# Patient Record
Sex: MALE | Race: WHITE | HISPANIC OR LATINO | Employment: FULL TIME | URBAN - METROPOLITAN AREA
[De-identification: names, ages, dates, MRNs, and addresses within clinical notes are randomized per-mention and may not be internally consistent; named-entity substitution may affect disease eponyms.]

---

## 2022-06-20 DIAGNOSIS — M25.512 BILATERAL SHOULDER PAIN, UNSPECIFIED CHRONICITY: Primary | ICD-10-CM

## 2022-06-20 DIAGNOSIS — M25.511 BILATERAL SHOULDER PAIN, UNSPECIFIED CHRONICITY: Primary | ICD-10-CM

## 2022-06-27 ENCOUNTER — HOSPITAL ENCOUNTER (OUTPATIENT)
Dept: RADIOLOGY | Facility: HOSPITAL | Age: 22
Discharge: HOME | End: 2022-06-27
Attending: ORTHOPAEDIC SURGERY
Payer: COMMERCIAL

## 2022-06-27 ENCOUNTER — OFFICE VISIT (OUTPATIENT)
Dept: SPORTS MEDICINE | Facility: CLINIC | Age: 22
End: 2022-06-27
Payer: COMMERCIAL

## 2022-06-27 VITALS
WEIGHT: 183.44 LBS | BODY MASS INDEX: 25.68 KG/M2 | HEART RATE: 74 BPM | DIASTOLIC BLOOD PRESSURE: 74 MMHG | HEIGHT: 71 IN | SYSTOLIC BLOOD PRESSURE: 135 MMHG

## 2022-06-27 DIAGNOSIS — M25.512 BILATERAL SHOULDER PAIN, UNSPECIFIED CHRONICITY: ICD-10-CM

## 2022-06-27 DIAGNOSIS — M25.511 CHRONIC RIGHT SHOULDER PAIN: Primary | ICD-10-CM

## 2022-06-27 DIAGNOSIS — G89.29 CHRONIC RIGHT SHOULDER PAIN: Primary | ICD-10-CM

## 2022-06-27 DIAGNOSIS — G25.89 SCAPULAR DYSKINESIS: ICD-10-CM

## 2022-06-27 DIAGNOSIS — M25.511 BILATERAL SHOULDER PAIN, UNSPECIFIED CHRONICITY: ICD-10-CM

## 2022-06-27 PROCEDURE — 73030 X-RAY EXAM OF SHOULDER: CPT | Mod: TC,50,PN

## 2022-06-27 PROCEDURE — 73030 XR SHOULDER COMPLETE 2 OR MORE VIEWS BILATERAL: ICD-10-PCS | Mod: 26,50,, | Performed by: RADIOLOGY

## 2022-06-27 PROCEDURE — 3078F DIAST BP <80 MM HG: CPT | Mod: CPTII,S$GLB,, | Performed by: ORTHOPAEDIC SURGERY

## 2022-06-27 PROCEDURE — 73030 X-RAY EXAM OF SHOULDER: CPT | Mod: 26,50,, | Performed by: RADIOLOGY

## 2022-06-27 PROCEDURE — 3075F SYST BP GE 130 - 139MM HG: CPT | Mod: CPTII,S$GLB,, | Performed by: ORTHOPAEDIC SURGERY

## 2022-06-27 PROCEDURE — 1159F MED LIST DOCD IN RCRD: CPT | Mod: CPTII,S$GLB,, | Performed by: ORTHOPAEDIC SURGERY

## 2022-06-27 PROCEDURE — 3008F BODY MASS INDEX DOCD: CPT | Mod: CPTII,S$GLB,, | Performed by: ORTHOPAEDIC SURGERY

## 2022-06-27 PROCEDURE — 3078F PR MOST RECENT DIASTOLIC BLOOD PRESSURE < 80 MM HG: ICD-10-PCS | Mod: CPTII,S$GLB,, | Performed by: ORTHOPAEDIC SURGERY

## 2022-06-27 PROCEDURE — 99999 PR PBB SHADOW E&M-EST. PATIENT-LVL III: ICD-10-PCS | Mod: PBBFAC,,, | Performed by: ORTHOPAEDIC SURGERY

## 2022-06-27 PROCEDURE — 3008F PR BODY MASS INDEX (BMI) DOCUMENTED: ICD-10-PCS | Mod: CPTII,S$GLB,, | Performed by: ORTHOPAEDIC SURGERY

## 2022-06-27 PROCEDURE — 3075F PR MOST RECENT SYSTOLIC BLOOD PRESS GE 130-139MM HG: ICD-10-PCS | Mod: CPTII,S$GLB,, | Performed by: ORTHOPAEDIC SURGERY

## 2022-06-27 PROCEDURE — 1159F PR MEDICATION LIST DOCUMENTED IN MEDICAL RECORD: ICD-10-PCS | Mod: CPTII,S$GLB,, | Performed by: ORTHOPAEDIC SURGERY

## 2022-06-27 PROCEDURE — 99999 PR PBB SHADOW E&M-EST. PATIENT-LVL III: CPT | Mod: PBBFAC,,, | Performed by: ORTHOPAEDIC SURGERY

## 2022-06-27 PROCEDURE — 99204 OFFICE O/P NEW MOD 45 MIN: CPT | Mod: S$GLB,,, | Performed by: ORTHOPAEDIC SURGERY

## 2022-06-27 PROCEDURE — 99204 PR OFFICE/OUTPT VISIT, NEW, LEVL IV, 45-59 MIN: ICD-10-PCS | Mod: S$GLB,,, | Performed by: ORTHOPAEDIC SURGERY

## 2022-06-27 NOTE — PROGRESS NOTES
CC: Bilateral shoulder pain     22 y.o. Male presents as a new patient to me.  He comes in with a stated complaint of bilateral shoulder pain.  Upon further discussion it sounds like his right shoulder is his primary issue.  He has a history of prior competitive swimming growing up in [place].  This was between 2006 and 2013. During that time he experienced some periscapular pain and dyskinesis which required some physical therapy and treatment.  This was beneficial.  He stopped swimming and began some recreational sports thereafter.  In 2014 he experienced a discrete injury to his right shoulder where his arm was forcefully pulled behind him and he felt acute onset pain with possibly an anterior instability event and shifting of the shoulder.  Since then, he has had chronic ongoing pain in the shoulder with mild subjective instability symptoms.  No prior discrete dislocation event.  Treatment has included physical therapy, most recent formally in [facility] with a private practice therapist.  This was for 6-8 weeks.  Pain is localized deep in the joint often posterior.  Occasional mechanical symptoms.  No neck or radicular symptoms.  Pain is present to some extent on a daily basis and is activity limiting.  He would like to be more active.  He still has some occasional subjective weakness and intermittent soreness in the left shoulder over the periscapular region but nothing too significant.      PAST MEDICAL HISTORY:   History reviewed. No pertinent past medical history.    PAST SURGICAL HISTORY:  History reviewed. No pertinent surgical history.    FAMILY HISTORY:  Family History   Problem Relation Age of Onset    Hypertension Father        MEDICATIONS:  No current outpatient medications on file.    ALLERGIES:  Review of patient's allergies indicates:  No Known Allergies     REVIEW OF SYSTEMS:  Constitution: Negative. Negative for chills, fever and night sweats.    Hematologic/Lymphatic: Negative for bleeding  "problem. Does not bruise/bleed easily.   Skin: Negative for dry skin, itching and rash.   Musculoskeletal: Negative for falls. Positive for right shoulder pain and muscle weakness.     All other review of symptoms were reviewed and found to be noncontributory.     PHYSICAL EXAMINATION:  Vitals:  /74   Pulse 74   Ht 5' 11" (1.803 m)   Wt 83.2 kg (183 lb 6.8 oz)   BMI 25.58 kg/m²    General: Well-developed well-nourished 22 y.o. malein no acute distress   Cardiovascular: Regular rhythm by palpation of distal pulse, normal color and temperature, no concerning varicosities on symptomatic side   Lungs: No labored breathing or wheezing appreciated   Neuro: Alert and oriented ×3   Psychiatric: well oriented to person, place and time, demonstrates normal mood and affect   Skin: No rashes, lesions or ulcers, normal temperature, turgor, and texture on uninvolved extremity    Ortho/SPM Exam  Examination of the right shoulder demonstrates intact overhead range of motion.  External rotation in the abducted position to 100°, 120° on the contralateral side.  He does have some pain in the abducted, externally rotated position.  Questionable anterior apprehension in terminal external rotation.  Some pain on anterior load and shift testing.  Negative posterior push-pull maneuver.  Increased total arc of motion on the contralateral side, total of 190°.  The patient does have some generalized hyperlaxity. Beighton's 4/9.  Negative sulcus sign bilaterally for instability or pain symptoms.  Mild bilateral scapular dyskinesis.  Intact rotator cuff strength.    IMAGING:  Xrays including AP, Outlet and Axillary Lateral of BILATERAL shoulder are ordered / images reviewed by me:   Negative    ASSESSMENT:      ICD-10-CM ICD-9-CM   1. Chronic right shoulder pain  M25.511 719.41    G89.29 338.29   2. Scapular dyskinesis  G25.89 781.3     PLAN:     Complaint of predominant right shoulder pain localized deep in the joint.  History of " prior discrete injury in 2014. Possible subtle symptomatic anterior instability with underlying labral tear.  He does have some baseline generalized hyperlaxity in both shoulders and history of prior scapular dyskinesis related symptoms.  I detect no multidirectional instability on exam today.    The right shoulder has been an ongoing issue now since 2014 and he recently has had a full course of formal physical therapy without much relief.  At this time would like to get a 3T MRI (contrast not currently available) of the shoulder for further assessment.  Return to clinic after study to discuss results treatment options.    Procedures

## 2022-07-13 ENCOUNTER — PATIENT MESSAGE (OUTPATIENT)
Dept: SPORTS MEDICINE | Facility: CLINIC | Age: 22
End: 2022-07-13
Payer: COMMERCIAL

## 2022-07-20 ENCOUNTER — HOSPITAL ENCOUNTER (OUTPATIENT)
Dept: RADIOLOGY | Facility: HOSPITAL | Age: 22
Discharge: HOME | End: 2022-07-20
Attending: ORTHOPAEDIC SURGERY
Payer: COMMERCIAL

## 2022-07-20 DIAGNOSIS — M25.511 CHRONIC RIGHT SHOULDER PAIN: ICD-10-CM

## 2022-07-20 DIAGNOSIS — G89.29 CHRONIC RIGHT SHOULDER PAIN: ICD-10-CM

## 2022-07-20 PROCEDURE — 73221 MRI JOINT UPR EXTREM W/O DYE: CPT | Mod: 26,RT,, | Performed by: RADIOLOGY

## 2022-07-20 PROCEDURE — 73221 MRI SHOULDER WITHOUT CONTRAST RIGHT: ICD-10-PCS | Mod: 26,RT,, | Performed by: RADIOLOGY

## 2022-07-20 PROCEDURE — 73221 MRI JOINT UPR EXTREM W/O DYE: CPT | Mod: TC,RT

## 2022-08-01 ENCOUNTER — OFFICE VISIT (OUTPATIENT)
Dept: SPORTS MEDICINE | Facility: CLINIC | Age: 22
End: 2022-08-01
Payer: COMMERCIAL

## 2022-08-01 VITALS
BODY MASS INDEX: 25.9 KG/M2 | DIASTOLIC BLOOD PRESSURE: 79 MMHG | HEIGHT: 71 IN | SYSTOLIC BLOOD PRESSURE: 131 MMHG | HEART RATE: 61 BPM | WEIGHT: 185 LBS

## 2022-08-01 DIAGNOSIS — M25.511 CHRONIC RIGHT SHOULDER PAIN: ICD-10-CM

## 2022-08-01 DIAGNOSIS — G25.89 SCAPULAR DYSKINESIS: ICD-10-CM

## 2022-08-01 DIAGNOSIS — G89.29 CHRONIC RIGHT SHOULDER PAIN: ICD-10-CM

## 2022-08-01 DIAGNOSIS — S43.431A SUPERIOR GLENOID LABRUM LESION OF RIGHT SHOULDER, INITIAL ENCOUNTER: Primary | ICD-10-CM

## 2022-08-01 PROCEDURE — 3075F PR MOST RECENT SYSTOLIC BLOOD PRESS GE 130-139MM HG: ICD-10-PCS | Mod: CPTII,S$GLB,, | Performed by: ORTHOPAEDIC SURGERY

## 2022-08-01 PROCEDURE — 99999 PR PBB SHADOW E&M-EST. PATIENT-LVL III: ICD-10-PCS | Mod: PBBFAC,,, | Performed by: ORTHOPAEDIC SURGERY

## 2022-08-01 PROCEDURE — 99214 PR OFFICE/OUTPT VISIT, EST, LEVL IV, 30-39 MIN: ICD-10-PCS | Mod: S$GLB,,, | Performed by: ORTHOPAEDIC SURGERY

## 2022-08-01 PROCEDURE — 3008F BODY MASS INDEX DOCD: CPT | Mod: CPTII,S$GLB,, | Performed by: ORTHOPAEDIC SURGERY

## 2022-08-01 PROCEDURE — 3008F PR BODY MASS INDEX (BMI) DOCUMENTED: ICD-10-PCS | Mod: CPTII,S$GLB,, | Performed by: ORTHOPAEDIC SURGERY

## 2022-08-01 PROCEDURE — 99999 PR PBB SHADOW E&M-EST. PATIENT-LVL III: CPT | Mod: PBBFAC,,, | Performed by: ORTHOPAEDIC SURGERY

## 2022-08-01 PROCEDURE — 3075F SYST BP GE 130 - 139MM HG: CPT | Mod: CPTII,S$GLB,, | Performed by: ORTHOPAEDIC SURGERY

## 2022-08-01 PROCEDURE — 3078F PR MOST RECENT DIASTOLIC BLOOD PRESSURE < 80 MM HG: ICD-10-PCS | Mod: CPTII,S$GLB,, | Performed by: ORTHOPAEDIC SURGERY

## 2022-08-01 PROCEDURE — 99214 OFFICE O/P EST MOD 30 MIN: CPT | Mod: S$GLB,,, | Performed by: ORTHOPAEDIC SURGERY

## 2022-08-01 PROCEDURE — 3078F DIAST BP <80 MM HG: CPT | Mod: CPTII,S$GLB,, | Performed by: ORTHOPAEDIC SURGERY

## 2022-08-01 PROCEDURE — 1159F MED LIST DOCD IN RCRD: CPT | Mod: CPTII,S$GLB,, | Performed by: ORTHOPAEDIC SURGERY

## 2022-08-01 PROCEDURE — 1159F PR MEDICATION LIST DOCUMENTED IN MEDICAL RECORD: ICD-10-PCS | Mod: CPTII,S$GLB,, | Performed by: ORTHOPAEDIC SURGERY

## 2022-08-02 ENCOUNTER — TELEPHONE (OUTPATIENT)
Dept: SPORTS MEDICINE | Facility: CLINIC | Age: 22
End: 2022-08-02
Payer: COMMERCIAL

## 2022-08-02 DIAGNOSIS — M25.511 CHRONIC RIGHT SHOULDER PAIN: Primary | ICD-10-CM

## 2022-08-02 DIAGNOSIS — G89.29 CHRONIC RIGHT SHOULDER PAIN: Primary | ICD-10-CM

## 2022-08-03 NOTE — PROGRESS NOTES
CC: Bilateral shoulder pain, R>L    Joo returns for follow-up of his right shoulder.  Recent MRI obtained.  He complains of pain deep in the shoulder with intermittent mechanical symptoms along with periscapular discomfort and symptoms associated with his longstanding dyskinesis.  He has had prior conservative treatment extensively to include multiple rounds of formal PT.    Prior History:   22 y.o. Male comes in with a stated complaint of bilateral shoulder pain.  Upon further discussion it sounds like his right shoulder is his primary issue.  He has a history of prior competitive swimming growing up in [place].  This was between 2006 and 2013. During that time he experienced some periscapular pain and dyskinesis which required some physical therapy and treatment.  This was beneficial.  He stopped swimming and began some recreational sports thereafter.  In 2014 he experienced a discrete injury to his right shoulder where his arm was forcefully pulled behind him and he felt acute onset pain with possibly an anterior instability event and shifting of the shoulder.  Since then, he has had chronic ongoing pain in the shoulder with mild subjective instability symptoms.  No prior discrete dislocation event.  Treatment has included physical therapy, most recent formally in [facility] with a private practice therapist.  This was for 6-8 weeks.  Pain is localized deep in the joint often posterior.  Occasional mechanical symptoms.  No neck or radicular symptoms.  Pain is present to some extent on a daily basis and is activity limiting.  He would like to be more active.  He still has some occasional subjective weakness and intermittent soreness in the left shoulder over the periscapular region but nothing too significant.      PAST MEDICAL HISTORY:   No past medical history on file.    PAST SURGICAL HISTORY:  No past surgical history on file.    FAMILY HISTORY:  Family History   Problem Relation Age of Onset    Hypertension  "Father      MEDICATIONS:  No current outpatient medications on file.    ALLERGIES:  Review of patient's allergies indicates:  No Known Allergies     REVIEW OF SYSTEMS:  Constitution: Negative. Negative for chills, fever and night sweats.    Hematologic/Lymphatic: Negative for bleeding problem. Does not bruise/bleed easily.   Skin: Negative for dry skin, itching and rash.   Musculoskeletal: Negative for falls. Positive for right shoulder pain and muscle weakness.     All other review of symptoms were reviewed and found to be noncontributory.     PHYSICAL EXAMINATION:  Vitals:  /79   Pulse 61   Ht 5' 11" (1.803 m)   Wt 83.9 kg (185 lb)   BMI 25.80 kg/m²    General: Well-developed well-nourished 22 y.o. malein no acute distress   Cardiovascular: Regular rhythm by palpation of distal pulse, normal color and temperature, no concerning varicosities on symptomatic side   Lungs: No labored breathing or wheezing appreciated   Neuro: Alert and oriented ×3   Psychiatric: well oriented to person, place and time, demonstrates normal mood and affect   Skin: No rashes, lesions or ulcers, normal temperature, turgor, and texture on uninvolved extremity    Ortho/SPM Exam  Examination of the right shoulder again demonstrates intact overhead range of motion.  Pain to palpation over the posterior glenohumeral joint line.  Negative AC joint.  Mild pain to palpation over the proximal biceps groove.  He does have positive biceps tension signs.  External rotation in the abducted position to 100°, 120° on the contralateral side.  He does have some pain in the abducted, externally rotated position.  Questionable anterior apprehension in terminal external rotation.  Some pain on anterior load and shift testing.  Positive compression rotation test.  Positive [place]'s test.  Negative posterior push-pull maneuver.  Increased total arc of motion on the contralateral side, total of 190°.  The patient does have some generalized " hyperlaxity. Beighton's 4/9.  Negative sulcus sign bilaterally for instability or pain symptoms.  Mild bilateral scapular dyskinesis.  Intact rotator cuff strength.    IMAGING:  Xrays including AP, Outlet and Axillary Lateral of BILATERAL shoulder are ordered / images reviewed by me:   Negative    MRI right shoulder (3T, non contrast):  Tear of superior labrum extending from anterior to posterior.    ASSESSMENT:      ICD-10-CM ICD-9-CM   1. Superior glenoid labrum lesion of right shoulder, initial encounter  S43.431A 840.7   2. Scapular dyskinesis  G25.89 781.3   3. Chronic right shoulder pain  M25.511 719.41    G89.29 338.29     PLAN:     Imaging reviewed with the patient.  He does have a fairly discrete SLAP tear on MRI.  Findings on exam correlate.  He also has longstanding symptoms related to his underlying bilateral scapular dyskinesis.  Treatment options for the shoulder discussed at this time.  He has had extensive prior conservative treatment.  We discussed arthroscopy for SLAP repair versus biceps tenodesis.  Details of surgery were reviewed to include the expected postop rehab and recovery course.  The patient would like to avoid surgery if possible and would like to continue with physical therapy.  He does not think his prior therapy was as good as it could have been.  New therapy referral placed.  Focus on periscapular stabilization and strengthening.  Return to clinic in 2 months.    Procedures

## 2022-08-19 ENCOUNTER — CLINICAL SUPPORT (OUTPATIENT)
Dept: REHABILITATION | Facility: HOSPITAL | Age: 22
End: 2022-08-19
Attending: ORTHOPAEDIC SURGERY
Payer: COMMERCIAL

## 2022-08-19 DIAGNOSIS — G89.29 CHRONIC RIGHT SHOULDER PAIN: ICD-10-CM

## 2022-08-19 DIAGNOSIS — M25.511 ACUTE PAIN OF RIGHT SHOULDER: ICD-10-CM

## 2022-08-19 DIAGNOSIS — M25.511 CHRONIC RIGHT SHOULDER PAIN: ICD-10-CM

## 2022-08-19 PROCEDURE — 97161 PT EVAL LOW COMPLEX 20 MIN: CPT | Performed by: PHYSICAL THERAPIST

## 2022-08-19 PROCEDURE — 97110 THERAPEUTIC EXERCISES: CPT | Performed by: PHYSICAL THERAPIST

## 2022-08-20 NOTE — PLAN OF CARE
[facility]  Physical Therapy Initial Evaluation    Date: 8/19/2022   Name: Joo Mariscal  Clinic Number: [id number]    Therapy Diagnosis:   Encounter Diagnoses   Name Primary?    Chronic right shoulder pain     Acute pain of right shoulder      Physician: BRYANNA Romero MD    Physician Orders: PT Eval and Treat   Medical Diagnosis from Referral: M25.511,G89.29 (ICD-10-CM) - Chronic right shoulder pain  Evaluation Date: 8/19/2022  Authorization Period Expiration: 8/2/2023  Plan of Care Expiration: 12/19/2022  Visit # / Visits authorized: 1/ 1    Time In: 1512  Time Out: 1600  Total Appointment Time (timed & untimed codes): 48 minutes    Precautions: Standard    Subjective   Date of onset: years  History of current condition - Joo reports: Patient complains of R>L shoulder pain today. He was playing basketball in high school and the ball hit his arm when abducted and ER, causing anterior shoulder pain. He used to swim until sophomore year of high school. Patient work requires him to climb ladders but is not painful. [place] 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 He would like to avoid surgery.     Medical History:   No past medical history on file.    Surgical History:   Joo Mariscal  has no past surgical history on file.    Medications:   Joo currently has no medications in their medication list.    Allergies:   Review of patient's allergies indicates:  No Known Allergies     Imaging, MRI studies:     Impression:     1. Tear of superior labrum extending from anterior to posterior.    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 male presenting alert and oriented    Posture: right scapula abducted with excessive upward rotation at full elevation       Passive Range of Motion:   Shoulder Right Left   Flexion 180 180   Abduction 180 180   ER at 0 60 55   ER at 90 130 110   IR 70 70      Active Range of Motion:   Shoulder Right Left   Flexion 180 180   Abduction 180 180   ER at 0 55 50   ER at 90 110 100   IR 70 70   Reach behind head t2 t2   Reach behind back  t12 t12     Strength:  Shoulder Right Left   Flexion 5 5   Abduction 5 5   ER 4 overhead 4+   IR 4 subscap 4   Serratus Anterior 4- 4-   Middle Trap 3- 4-   Low Trap 4- 4-        Joint Mobility: Approximately 200 deg total arc of motion to the R shoulder. Left shoulder more internally rotated than the R. Early upward rotation to the right scapula    Palpation: No pain to palpation    Sensation: Intact    Flexibility:   Lat: R normal ; L normal   Pec Minor: R normal ; L normal      Limitation/Restriction for FOTO Shoulder Survey    Therapist reviewed FOTO scores for Joo Mariscal on 8/19/2022.   FOTO documents entered into EPIC - see Media section.    Limitation Score: See media scores         TREATMENT   Treatment Time In: 1545  Treatment Time Out: 1600  Total Treatment time (time-based codes) separate from Evaluation: 15 minutes    Joo received therapeutic exercises to develop strength and endurance for 15 minutes including:    IR/ER walkouts 90/90 in front OTB 2 x 15  Serratus wall slide 2 x 8 OTB  Midtrap retraining prone 8x 8 sec holds    Home Exercises and Patient Education Provided    Education provided:   - Improve mid trap strength, subscap strength, and end range ER stabilization   - Scap stability for excessive GH mobility     Written Home Exercises Provided: yes.  Exercises were reviewed and patient was  able to demonstrate them prior to the end of the session.  Patient demonstrated good  understanding of the education provided.     See EMR under Patient Instructions for exercisesprovided 8/19/2022.    Assessment   Joo is a 22 y.o. male referred to outpatient Physical Therapy with a medical diagnosis of chronic right shoulder pain. Pt presents with excessive GH joint mobility, previous anterior apprehension and possible sublux, pain at end ranges, unable to lift overhead without pain and difficulty completing ADL independently without pain. Patient will benefit from cuff stabilization and rhythmic stab in all planes of motion to improve stability of RUE.     Pt prognosis is Excellent.   Pt will benefit from skilled outpatient Physical Therapy to address the deficits stated above and in the chart below, provide pt/family education, and to maximize pt's level of independence.     Plan of care discussed with patient: Yes  Pt's spiritual, cultural and educational needs considered and patient is agreeable to the plan of care and goals as stated below:     Anticipated Barriers for therapy: scheduling     Medical Necessity is demonstrated by the following  History  Co-morbidities and personal factors that may impact the plan of care Co-morbidities:   level of undertstanding of current condition    Personal Factors:   no deficits     low   Examination  Body Structures and Functions, activity limitations and participation restrictions that may impact the plan of care Body Regions:   upper extremities    Body Systems:    ROM  strength  motor control  motor learning    Participation Restrictions:   covid-19    Activity limitations:   Learning and applying knowledge  no deficits    General Tasks and Commands  no deficits    Communication  no deficits    Mobility  lifting and carrying objects    Self care  no deficits    Domestic Life  no deficits    Interactions/Relationships  no deficits    Life Areas  no  deficits    Community and Social Life  no deficits         low   Clinical Presentation stable and uncomplicated low   Decision Making/ Complexity Score: low     GOALS: Short Term Goals:  4 weeks  1.Report decreased shoudler pain < / =  2/10  to increase tolerance for return to overhead lifting  2. Increase middle trap strength to 4-/5 to improve RUE stabilization   3. Increased strength by 1/3 MMT grade in serratus and cuff to increase tolerance for ADL and work activities.  4. Pt to tolerate HEP to improve ROM and independence with ADL's    Long Term Goals: 8 weeks  1.Report decreased shoulder pain  < / =  0 /10  to increase tolerance for return to sport pain free  2.Increase AROM to full motion pain free  3.Increase strength to >/= 4/5 in cuff and mid/low trap to increase tolerance for ADL and work activities.  4. Pt goal: return to basketball pain free  5. Pt will have improved gcode of CJ (20-40% limited) on FOTO shoulder in order to demonstrate true functional improvement.      Plan   Plan of care Certification: 8/19/2022 to 12/19/2022.    Outpatient Physical Therapy 2 times weekly for 10 weeks to include the following interventions: Manual Therapy, Moist Heat/ Ice, Neuromuscular Re-ed, Patient Education, Self Care, Therapeutic Activities and Therapeutic Exercise.     Jim Stokes, PT

## 2022-09-29 ENCOUNTER — OFFICE VISIT (OUTPATIENT)
Dept: SLEEP MEDICINE | Facility: CLINIC | Age: 22
End: 2022-09-29
Payer: COMMERCIAL

## 2022-09-29 DIAGNOSIS — G47.30 SLEEP APNEA, UNSPECIFIED TYPE: Primary | ICD-10-CM

## 2022-09-29 PROCEDURE — 99213 PR OFFICE/OUTPT VISIT, EST, LEVL III, 20-29 MIN: ICD-10-PCS | Mod: 95,,, | Performed by: PSYCHIATRY & NEUROLOGY

## 2022-09-29 PROCEDURE — 1160F RVW MEDS BY RX/DR IN RCRD: CPT | Mod: CPTII,95,, | Performed by: PSYCHIATRY & NEUROLOGY

## 2022-09-29 PROCEDURE — 1160F PR REVIEW ALL MEDS BY PRESCRIBER/CLIN PHARMACIST DOCUMENTED: ICD-10-PCS | Mod: CPTII,95,, | Performed by: PSYCHIATRY & NEUROLOGY

## 2022-09-29 PROCEDURE — 1159F PR MEDICATION LIST DOCUMENTED IN MEDICAL RECORD: ICD-10-PCS | Mod: CPTII,95,, | Performed by: PSYCHIATRY & NEUROLOGY

## 2022-09-29 PROCEDURE — 1159F MED LIST DOCD IN RCRD: CPT | Mod: CPTII,95,, | Performed by: PSYCHIATRY & NEUROLOGY

## 2022-09-29 PROCEDURE — 99213 OFFICE O/P EST LOW 20 MIN: CPT | Mod: 95,,, | Performed by: PSYCHIATRY & NEUROLOGY

## 2022-09-29 NOTE — PROGRESS NOTES
The patient location is: home  The chief complaint leading to consultation is: sleep disorder  Visit type: audiovisual  Total time spent with patient: 30 min  Each patient to whom he or she provides medical services by telemedicine is:  (1) informed of the relationship between the physician and patient and the respective role of any other health care provider with respect to management of the patient; and (2) notified that he or she may decline to receive medical services by telemedicine and may withdraw from such care at any time.       Joo Mariscal is a 22 y.o. male is here to be evaluated for a sleep disorder; referred by No ref. provider found.    Reports episodes of makingsexual advances on his girlfriend in his sleep without recollection over the last year. This his fist serious relationship- so he is not sure if he has done that prior to that. His eyes could be open or closed, epio during the episodes; and he would get confused if woken up in the middle of an episode  In his college years he sleepwalked and urinated on the floor.    He does not recall sleepwalking in childhood, but has a family history of that.  He has been sleep talking; used to grind his teeth in his sleep in childhood        --------------------------------------------------  [place] Sleepiness Scale  Situation                         Chance of Dozing or Sleeping      Sitting and reading                                                        ___1_    Watching TV                                                                __2__    Sitting inactive in a public place                                              __0__    Being a passenger in a motor vehicle for an hour or more   __1_    Lying down in the afternoon                                                   __0__    Sitting and talking to someone                                       __0__    Sitting quietly after lunch (no alcohol)                           ___0_    Stopped for a few  minutes in traffic                             ___0_    Total score                                                                        ___3_          His mother said that he used to be gaping for air in his sleep at the age of 8 - did not proceed with any interventions since.     The patient reports snoring since  childhood.   Joo Mariscal denied  snoring.    The patient does not feel rested upon awakening. Does not take naps.     The patient  denies morning headaches and reports  dry mouth on awakening.   Joo Mariscal reports occasional  nasal congestion.    The patient never had tonsillectomy, adenoidectomy or UPPP    The patient denies difficulty falling and staying asleep.    Joo Mariscal  reports symptoms concerning for parasomnia as described above; denight screaming out of his sleep; also reported occasional nightmare. One episode of dream enacting.   The patient  denies auxiliary symptoms of narcolepsy including sleep onset paralysis, hypnagogic hallucinations, sleep attacks and cataplexy.    Joo Mariscal denied symptoms concerning for RLS; nocturnal leg movements have not been noticed.   The patient does not experience sleep related leg cramps.       Bedtime: 10 PM  Leep latency no more than an hour  Awakenings per night: 0-1 to go to the bathroom - going back to the bathroom.  Wake time: 6 AM    Medications pertinent to sleep  disorders taken currently: no  Previous  medications taken  for sleep disorders:  Melatonin in college - bad dependancy on smoking THC - was hard to recover, it is then when he took           Sleep studies  no        Occupation: 9 hrs    Bed partner: his girlfired  Exercise routine: no  Caffeine:  3-4 cups beverages per day; no later than 12 ; no later than 3 pm (rare)  Alcohol: no  Smoking:no      DME:         PAST MEDICAL HISTORY:    Active Ambulatory Problems     Diagnosis Date Noted    Acute pain of right shoulder 08/19/2022     Resolved Ambulatory Problems     Diagnosis Date  Noted    No Resolved Ambulatory Problems     No Additional Past Medical History                PAST SURGICAL HISTORY:    No past surgical history on file.      FAMILY HISTORY:                Family History   Problem Relation Age of Onset    Hypertension Father        SOCIAL HISTORY:          Tobacco:   Social History     Tobacco Use   Smoking Status Not on file   Smokeless Tobacco Not on file       alcohol use:    Social History     Substance and Sexual Activity   Alcohol Use Not on file                   ALLERGIES:  Review of patient's allergies indicates:  No Known Allergies    CURRENT MEDICATIONS:    No current outpatient medications on file.     No current facility-administered medications for this visit.                        PHYSICAL EXAM:  There were no vitals taken for this visit.          ASSESSMENT:    1. 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  2.Sleep Apnea.  The patient symptomatically has  snoring, non refreshing sleep, excessive daytime fatigue, difficulty with focusing, and short term memory concerns  and parasomnia which may be triggered by arousals from N3 sleep. This warrants further investigation for possible obstructive sleep apnea.              PLAN:    Melatonin 5-10 mg, better in Time Release formulation.    Diagnostic: Home Sleep Study. The nature of this procedure and its indication was discussed with the patient. We will notify the patient about sleep study resuts via My Chart.  At thatpoint I will check if Melatonin has been effective in stabilizing his sleep/minimizig the nocturnal behavior.       During our discussion today, we talked about the etiology of SHERRIE as well as the potential ramifications of untreated sleep apnea, which could include daytime sleepiness, hypertension, heart disease and/or stroke.  We discussed potential treatment options, which could include weight loss, body positioning, continuous positive  airway pressure (CPAP), or referral for surgical consideration. Meanwhile, he  is urged to avoid supine sleep, weight gain and alcoholic beverages since all of these can worsen SHERRIE.     The patient was given open opportunity to ask questions and/or express concerns about treatment plan. Two point patient identifier confirmed.       Precautions: The patient was advised to abstain from driving should he feel sleepy or drowsy.    Follow up: MD after the sleep study has been completed.     31-minute visit. >50% spent counseling patient and coordination of care.  The patient was  cautioned against drowsy driving.

## 2022-09-29 NOTE — PATIENT INSTRUCTIONS
SLEEP LAB (Agueda or Mike) will contact you to schedulethe sleep study. Their number is [phone] (ext 2). Please call them if you do not hear from them in 2 weeks from now.  The [facility] is located on 7th floor of the [facility]; [facility] is located in [facility].    SLEEP CLINIC (my assistant) will call you when the sleep study results are ready - if you have not heard from us by 2 weeks from the date of the study, please call [phone] (ext 1) or you can use [facility] to contact me.    You are advised to abstain from driving should you feel sleepy or drowsy.  ______________________________________\\\    Melatonin - 5-10 mg - cantry Melatonin can TR - Time Relese

## 2022-10-03 ENCOUNTER — TELEPHONE (OUTPATIENT)
Dept: SLEEP MEDICINE | Facility: OTHER | Age: 22
End: 2022-10-03
Payer: COMMERCIAL

## 2022-10-06 ENCOUNTER — PATIENT MESSAGE (OUTPATIENT)
Dept: SLEEP MEDICINE | Facility: CLINIC | Age: 22
End: 2022-10-06
Payer: COMMERCIAL

## 2022-10-06 ENCOUNTER — TELEPHONE (OUTPATIENT)
Dept: SLEEP MEDICINE | Facility: OTHER | Age: 22
End: 2022-10-06
Payer: COMMERCIAL

## 2022-10-07 ENCOUNTER — HOSPITAL ENCOUNTER (OUTPATIENT)
Dept: SLEEP MEDICINE | Facility: OTHER | Age: 22
Discharge: HOME | End: 2022-10-07
Attending: PSYCHIATRY & NEUROLOGY
Payer: COMMERCIAL

## 2022-10-07 DIAGNOSIS — G47.33 OSA (OBSTRUCTIVE SLEEP APNEA): Primary | ICD-10-CM

## 2022-10-07 DIAGNOSIS — G47.30 SLEEP APNEA, UNSPECIFIED TYPE: ICD-10-CM

## 2022-10-07 PROCEDURE — 95806 SLEEP STUDY UNATT&RESP EFFT: CPT | Mod: 26,,, | Performed by: PSYCHIATRY & NEUROLOGY

## 2022-10-07 PROCEDURE — 95800 SLP STDY UNATTENDED: CPT

## 2022-10-07 PROCEDURE — 95806 PR SLEEP STUDY, UNATTENDED, SIMUL RECORD HR/O2 SAT/RESP FLOW/RESP EFFT: ICD-10-PCS | Mod: 26,,, | Performed by: PSYCHIATRY & NEUROLOGY

## 2022-10-10 PROBLEM — G47.30 SLEEP APNEA: Status: ACTIVE | Noted: 2022-10-10

## 2022-10-13 ENCOUNTER — PATIENT MESSAGE (OUTPATIENT)
Dept: SLEEP MEDICINE | Facility: CLINIC | Age: 22
End: 2022-10-13
Payer: COMMERCIAL

## 2022-10-13 PROBLEM — G47.33 OSA (OBSTRUCTIVE SLEEP APNEA): Status: ACTIVE | Noted: 2022-10-10

## 2022-10-13 NOTE — PROCEDURES
PHYSICIAN INTERPRETATION AND COMMENTS: Findings are consistent with mild, obstructive sleep apnea (SHERRIE) (G47.33),  by overall AHI (apnea hypopnea index). This study was technically adequate to allow for interpretation.  CLINICAL HISTORY: 22 year old male presented with: 16 inch neck, BMI of 25.8, an [place] sleepiness score of 4, no  co-morbidities and symptoms of nocturnal snoring. Based on the clinical history, the patient has a low pre-test  probability of having Mild SHERRIE.  SLEEP STUDY FINDINGS: Patient underwent a 1 night Home Sleep Test and by behavioral criteria, slept for approximately  5.99 hours, with a sleep latency of 4 minutes and a sleep efficiency of 85.5%. Mild sleep disordered breathing (AHI=6) is  noted based on a 4% hypopnea desaturation criteria. The patient slept supine 89.4% of the night based on valid recording  time of 5.99 hours. When considering more subtle measures of sleep disordered breathing, the overall respiratory  disturbance index is mild(RDI=12) based on a 1% hypopnea desaturation criteria with confirmation by surrogate arousal  indicators. The apneas/hypopneas are accompanied by minimal oxygen desaturation (percent time below 90% SpO2: 0%,  Min SpO2: 90%). The average desaturation across all sleep disordered breathing events is 2.4%. The mean pulse rate is 66.1  BPM, with infrequent pulse rate variability (34 events with >= 6 BPM increase/decrease per hour).  TREATMENT CONSIDERATIONS: Consider trial of Auto-titrating CPAP 6-20 cm, mask of patient's choice, and heated  humidification. If patient has difficulty with CPAP adherence or ongoing SHERRIE symptoms or despite CPAP adherence, then  consider an in-lab titration sleep study in order to determine optimal fixed CPAP setting. Alternatively consider oral  appliance fitted by a dentist specializing in these devices, or surgical consultation for uvulopalatopharyngoplasty (UPPP)  for treatment of obstructive sleep apnea.  DISEASE  MANAGEMENT CONSIDERATIONS: Definitive treatment for SHERRIE is recommended. Consider Sleep Clinic referral for  SHERRIE management.  Signature:

## 2022-12-22 ENCOUNTER — OFFICE VISIT (OUTPATIENT)
Dept: SLEEP MEDICINE | Facility: CLINIC | Age: 22
End: 2022-12-22
Payer: COMMERCIAL

## 2022-12-22 DIAGNOSIS — G47.50 PARASOMNIA, UNSPECIFIED TYPE: ICD-10-CM

## 2022-12-22 DIAGNOSIS — G47.33 OSA (OBSTRUCTIVE SLEEP APNEA): Primary | ICD-10-CM

## 2022-12-22 PROCEDURE — 1159F PR MEDICATION LIST DOCUMENTED IN MEDICAL RECORD: ICD-10-PCS | Mod: CPTII,95,, | Performed by: PSYCHIATRY & NEUROLOGY

## 2022-12-22 PROCEDURE — 1160F PR REVIEW ALL MEDS BY PRESCRIBER/CLIN PHARMACIST DOCUMENTED: ICD-10-PCS | Mod: CPTII,95,, | Performed by: PSYCHIATRY & NEUROLOGY

## 2022-12-22 PROCEDURE — 1160F RVW MEDS BY RX/DR IN RCRD: CPT | Mod: CPTII,95,, | Performed by: PSYCHIATRY & NEUROLOGY

## 2022-12-22 PROCEDURE — 1159F MED LIST DOCD IN RCRD: CPT | Mod: CPTII,95,, | Performed by: PSYCHIATRY & NEUROLOGY

## 2022-12-22 PROCEDURE — 99214 PR OFFICE/OUTPT VISIT, EST, LEVL IV, 30-39 MIN: ICD-10-PCS | Mod: 95,,, | Performed by: PSYCHIATRY & NEUROLOGY

## 2022-12-22 PROCEDURE — 99214 OFFICE O/P EST MOD 30 MIN: CPT | Mod: 95,,, | Performed by: PSYCHIATRY & NEUROLOGY

## 2022-12-22 NOTE — PROGRESS NOTES
The patient location is: home  The chief complaint leading to consultation is: sleep disorder  Visit type: audiovisual  Total time spent with patient: 30 min  Each patient to whom he or she provides medical services by telemedicine is:  (1) informed of the relationship between the physician and patient and the respective role of any other health care provider with respect to management of the patient; and (2) notified that he or she may decline to receive medical services by telemedicine and may withdraw from such care at any time.      EPWORTH SLEEPINESS SCALE TOTAL SCORE  12/22/2022   Total score 4       Joo Mariscal is a 22 y.o. male seen today for Parasomnia and SHERRIE  follow up. Last seen on 9/29/2022    Melatonin XR taken every night 5 mg since his last visit up to 10 mg provided him with deep sleep.  No episodes of Sexsomnia were taking place since starting Melatonin in 10/22 up  until last week (the event was mild). Happened right after falling asleep as usual.    Coming to discuss sleep study results.  Would like to pursue treatment of mild SHERRIE to see if less sleep interruptions witll result in resolution of sexsomnia.      Initial history:  Reports episodes of makingsexual advances on his girlfriend in his sleep without recollection over the last year. This his fist serious relationship- so he is not sure if he has done that prior to that. His eyes could be open or closed, epio during the episodes; and he would get confused if woken up in the middle of an episode  In his college years he sleepwalked and urinated on the floor.  He does not recall sleepwalking in childhood, but has a family history of that.  He has been sleep talking; used to grind his teeth in his sleep in childhood      Joo Mariscal  reports symptoms concerning for parasomnia as described above; denight screaming out of his sleep; also reported occasional nightmare. One episode of dream enacting.   T  Bedtime: 10 PM  Leep latency no more  than an hour  Awakenings per night: 0-1 to go to the bathroom - going back to the bathroom.  Wake time: 6 AM    Medications pertinent to sleep  disorders taken currently: Melatonin TR 5 (on weekdays)-10(on weeknds)  Previous  medications taken  for sleep disorders:       Sleep studies  HST  10/22 Mild sleep disordered breathing (AHI=6) is noted based on a 4% hypopnea desaturation criteria. The patient slept supine 89.4% of the night based on valid recording  time of 5.99 hours. When considering more subtle measures of sleep disordered breathing, the overall respiratory  disturbance index is mild(RDI=12) based on a 1% hypopnea desaturation criteria with confirmation by surrogate arousal  indicators. The apneas/hypopneas are accompanied by minimal oxygen desaturation (percent time below 90% SpO2: 0%,  Min SpO2: 90%).       Occupation: 9 hrs    Bed partner: his girlfired  Exercise routine: no  Caffeine:  3-4 cups beverages per day; no later than 12 ; no later than 3 pm (rare)  Alcohol: no  Smoking:no      ASSESSMENT:    1. 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  2.SHERRIE - mild  The patient symptomatically has  snoring, non refreshing sleep, excessive daytime fatigue, difficulty with focusing, and short term memory concerns  and parasomnia which may be triggered by arousals from N3 sleep. This warrants further investigation for possible obstructive sleep apnea.          PLAN:    Sleep study were discussed during this tele-visit with graphs and chart demonstration, all the questions were answered.       Continue Melatonin XR 5-10 mg  Trreatment options for mild SHERRIE were discussed with the patient - he would llike to try oral applaince.  He will  try an OA  for snoring before contacting the dentist for OA (oral appliance) for SHERRIE    He will let me know if he would like to do HST with OA (oral appliance) to check if effectivness 9he has very sporadic  snoring, so that can not be used as an indicator of OA effectiveness.     CPAP option was also discussed    Possible future use of Clonazepam (if all else fails) was also discussed with the patient.     During our discussion today, we talked about the etiology of SHERRIE as well as the potential ramifications of untreated sleep apnea, which could include daytime sleepiness, hypertension, heart disease and/or stroke.  We discussed potential treatment options, which could include weight loss, body positioning, continuous positive airway pressure (CPAP), or referral for surgical consideration. Meanwhile, he  is urged to avoid supine sleep, weight gain and alcoholic beverages since all of these can worsen SHERRIE.     The patient was given open opportunity to ask questions and/or express concerns about treatment plan. Two point patient identifier confirmed.       Precautions: The patient was advised to abstain from driving should he feel sleepy or drowsy.    Follow up: MD after the sleep study has been completed.     31-minute visit. >50% spent counseling patient and coordination of care.  The patient was  cautioned against drowsy driving.

## 2023-02-07 ENCOUNTER — PATIENT MESSAGE (OUTPATIENT)
Dept: SLEEP MEDICINE | Facility: CLINIC | Age: 23
End: 2023-02-07
Payer: COMMERCIAL

## 2023-02-08 DIAGNOSIS — G47.33 OSA (OBSTRUCTIVE SLEEP APNEA): Primary | ICD-10-CM

## 2023-02-20 ENCOUNTER — PATIENT MESSAGE (OUTPATIENT)
Dept: SLEEP MEDICINE | Facility: CLINIC | Age: 23
End: 2023-02-20
Payer: COMMERCIAL

## 2023-02-20 DIAGNOSIS — G47.33 OSA (OBSTRUCTIVE SLEEP APNEA): Primary | ICD-10-CM

## 2023-07-17 ENCOUNTER — OFFICE VISIT (OUTPATIENT)
Dept: URGENT CARE | Facility: CLINIC | Age: 23
End: 2023-07-17
Payer: COMMERCIAL

## 2023-07-17 VITALS
BODY MASS INDEX: 25.89 KG/M2 | WEIGHT: 184.94 LBS | HEART RATE: 74 BPM | DIASTOLIC BLOOD PRESSURE: 70 MMHG | RESPIRATION RATE: 16 BRPM | SYSTOLIC BLOOD PRESSURE: 110 MMHG | OXYGEN SATURATION: 99 % | HEIGHT: 71 IN | TEMPERATURE: 97 F

## 2023-07-17 DIAGNOSIS — R10.11 RIGHT UPPER QUADRANT ABDOMINAL PAIN: Primary | ICD-10-CM

## 2023-07-17 PROCEDURE — 99213 OFFICE O/P EST LOW 20 MIN: CPT | Mod: S$GLB,,, | Performed by: FAMILY MEDICINE

## 2023-07-17 PROCEDURE — 99213 PR OFFICE/OUTPT VISIT, EST, LEVL III, 20-29 MIN: ICD-10-PCS | Mod: S$GLB,,, | Performed by: FAMILY MEDICINE

## 2023-07-17 RX ORDER — DICYCLOMINE HYDROCHLORIDE 20 MG/1
20 TABLET ORAL 3 TIMES DAILY PRN
Qty: 20 TABLET | Refills: 0 | Status: SHIPPED | OUTPATIENT
Start: 2023-07-17 | End: 2023-07-24

## 2023-07-17 RX ORDER — OMEPRAZOLE 20 MG/1
20 CAPSULE, DELAYED RELEASE ORAL 2 TIMES DAILY
Qty: 28 CAPSULE | Refills: 0 | Status: SHIPPED | OUTPATIENT
Start: 2023-07-17 | End: 2023-08-16

## 2023-07-17 RX ORDER — FINASTERIDE 1 MG/1
TABLET, FILM COATED ORAL
COMMUNITY
Start: 2022-07-11

## 2023-07-17 NOTE — PROGRESS NOTES
"Subjective:      Patient ID: Joo Mariscal is a 23 y.o. male.    Vitals:  height is 5' 11" (1.803 m) and weight is 83.9 kg (184 lb 15.5 oz). His oral temperature is 97.3 °F (36.3 °C). His blood pressure is 110/70 and his pulse is 74. His respiration is 16 and oxygen saturation is 99%.     Chief Complaint: Abdominal Pain (Sharp pain at the top of abdominal, slightly below and to the right of my sternum. Pain started on Saturday afternoon. - Entered by patient)    Patient is a 23 y.o. male whom reports to the clinic with the compliant of generalized abdominal pain that radiates slightly below the right rib cage presented on Saturday, he reports with taking Gas-x and several other antacids to help relieve the pain. Also reports belching and gas.Yaneth N/V, diarrhea, constipation. States the pain started after eating the whole pizza. Pt denies any abd pain at present.     Abdominal Pain  This is a new problem. The current episode started yesterday. The onset quality is sudden. The problem occurs constantly. The most recent episode lasted 1 hour. The problem has been unchanged. The pain is located in the generalized abdominal region and RUQ. The pain is at a severity of 6/10. The pain is mild. The quality of the pain is colicky, sharp and a sensation of fullness. The abdominal pain radiates to the RUQ. Associated symptoms include belching and flatus. Pertinent negatives include no constipation or diarrhea. The pain is aggravated by deep breathing, certain positions and movement (sitting down from standing and vice versa). The pain is relieved by Liquids and being still. He has tried antacids for the symptoms. The treatment provided no relief. There is no history of abdominal surgery, colon cancer, Crohn's disease, gallstones, GERD, irritable bowel syndrome, pancreatitis, PUD or ulcerative colitis. Patient's medical history does not include kidney stones and UTI.     Gastrointestinal:  Positive for abdominal pain. Negative " for history of abdominal surgery, constipation and diarrhea.    Objective:     Physical Exam   Constitutional: He is oriented to person, place, and time. He appears well-developed.   HENT:   Head: Normocephalic and atraumatic.   Ears:   Right Ear: Tympanic membrane, external ear and ear canal normal. impacted cerumen  Left Ear: External ear and ear canal normal. impacted cerumen  Nose: Nose normal. No rhinorrhea or congestion.   Mouth/Throat: Mucous membranes are normal. No oropharyngeal exudate or posterior oropharyngeal erythema.   Eyes: Conjunctivae and lids are normal.   Neck: Trachea normal. Neck supple.   Cardiovascular: Normal rate, regular rhythm and normal heart sounds.   No murmur heard.  Pulmonary/Chest: Effort normal and breath sounds normal. No stridor. No respiratory distress. He has no wheezes. He has no rhonchi. He has no rales.   Abdominal: Normal appearance and bowel sounds are normal. He exhibits no distension and no mass. Soft. There is no abdominal tenderness. There is no rebound, no guarding, no left CVA tenderness and no right CVA tenderness.   Musculoskeletal: Normal range of motion.         General: Normal range of motion.      Cervical back: He exhibits no tenderness.   Lymphadenopathy:     He has no cervical adenopathy.   Neurological: He is alert, oriented to person, place, and time and at baseline. He has normal strength.   Skin: Skin is warm, dry, intact, not diaphoretic and not pale.   Psychiatric: His speech is normal and behavior is normal. Judgment and thought content normal.   Nursing note and vitals reviewed.    Assessment:     1. Right upper quadrant abdominal pain        Plan:       Right upper quadrant abdominal pain  -     US Abdomen Complete; Future; Expected date: 07/17/2023    Other orders  -     omeprazole (PRILOSEC) 20 MG capsule; Take 1 capsule (20 mg total) by mouth 2 (two) times daily.  Dispense: 28 capsule; Refill: 0  -     dicyclomine (BENTYL) 20 mg tablet; Take 1  tablet (20 mg total) by mouth 3 (three) times daily as needed (stomach pain).  Dispense: 20 tablet; Refill: 0

## 2023-07-18 ENCOUNTER — HOSPITAL ENCOUNTER (OUTPATIENT)
Dept: RADIOLOGY | Facility: HOSPITAL | Age: 23
Discharge: HOME | End: 2023-07-18
Attending: FAMILY MEDICINE
Payer: COMMERCIAL

## 2023-07-18 DIAGNOSIS — R10.11 RIGHT UPPER QUADRANT ABDOMINAL PAIN: ICD-10-CM

## 2023-07-18 PROCEDURE — 76700 US EXAM ABDOM COMPLETE: CPT | Mod: TC

## 2023-07-18 PROCEDURE — 76700 US ABDOMEN COMPLETE: ICD-10-PCS | Mod: 26,,, | Performed by: RADIOLOGY

## 2023-07-18 PROCEDURE — 76700 US EXAM ABDOM COMPLETE: CPT | Mod: 26,,, | Performed by: RADIOLOGY

## 2023-07-20 ENCOUNTER — TELEPHONE (OUTPATIENT)
Dept: URGENT CARE | Facility: CLINIC | Age: 23
End: 2023-07-20
Payer: COMMERCIAL

## 2023-07-20 NOTE — TELEPHONE ENCOUNTER
Left voice message regarding ultrasound results: no gallstones or other abnormalities to explain abdominal pain.

## 2024-07-10 NOTE — PROGRESS NOTES
[facility] Progress Note  7/12/2024          Reason for Visit:      had concerns including Annual Exam.       History of Present Illness:       Pt is here today to establish care.  He says he has not had a wellness visit many years.      Shoulder pain  Has had problems for about 10 years.  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  Right shoulder MRI 7/20/2022  Impression:  1. Tear of superior labrum extending from anterior to posterior.Didn't want to do surgery at the time of the MRI  Did PT previously but stopped -   Has been preventing him from playing basketball  Last time saw orthopedic was a couple years ago.  He is agreeable to see them again.  He says he makes consider surgery at this point if recommended    SHERRIE  Diagnosed with mild sleep apnea  Uses CPAP   Has follow up with sleep medicine scheduled.      Balanitis?  Patient is uncircumcised and says that he has had difficulty over the years retracting his foreskin fully.  He says he thinks he has a little bit of scarring that is made this more difficult.  He occasionally will get red dots underneath his glans penis.  He says the thoughts are not painful and they tend to resolve.  He washes the area well with water.  It has not bothering him currently, we discussed that we think balanitis as the most likely diagnosis of their creams that we could give him to use for this as needed.      HM  Encouraged COVID shot at the pharmacy.    We will give Tdap and Gardasil 1. Here today.  He is agreeable to schedule some routine labs        Problem List:     Patient Active Problem List   Diagnosis    Sleep apnea    Bilateral shoulder pain    SHERRIE (obstructive sleep apnea)         Surgical History:     He has a past surgical history that includes None.      Family History:      His family history includes  "Bipolar disorder in his brother; Brain cancer in his paternal grandmother; Hypertension in his father.      Social History:     He reports that he has never smoked. He has never been exposed to tobacco smoke. He has quit using smokeless tobacco. He reports that he does not currently use alcohol. He reports that he does not currently use drugs after having used the following drugs: Marijuana.      Medications:       Current Outpatient Medications:     finasteride (PROPECIA) 1 mg tablet, , Disp: , Rfl:     Current Facility-Administered Medications:     hpv vaccine,9-anna (GARDASIL 9) vaccine 0.5 mL, 0.5 mL, Intramuscular, 1 time in Clinic/HOD,     Tdap (BOOSTRIX) vaccine injection 0.5 mL, 0.5 mL, Intramuscular, 1 time in Clinic/HOD,         Allergies:   He has No Known Allergies.      Review of Systems:     Review of Systems   Constitutional:  Negative for chills and fever.   HENT:  Negative for ear pain and sore throat.    Respiratory:  Negative for cough, shortness of breath and wheezing.    Cardiovascular:  Negative for chest pain and palpitations.   Gastrointestinal:  Negative for constipation, diarrhea, nausea and vomiting.   Genitourinary:  Negative for dysuria and hematuria.   Musculoskeletal:  Negative for joint swelling and joint deformity.   Neurological:  Negative for dizziness and weakness.           Physical Exam:     Temp:             Blood Pressure:  108/82        Pulse:   60     Respirations:     Weight:   74.5 kg (164 lb 3.9 oz)  Height:   5' 11" (1.803 m)  BMI:     Body mass index is 22.91 kg/m².    Physical Exam  Constitutional:       General: He is not in acute distress.  HENT:      Right Ear: Tympanic membrane normal.      Left Ear: Tympanic membrane normal.      Nose: No congestion or rhinorrhea.      Mouth/Throat:      Pharynx: No oropharyngeal exudate or posterior oropharyngeal erythema.   Cardiovascular:      Rate and Rhythm: Normal rate and regular rhythm.   Pulmonary:      Effort: Pulmonary " effort is normal. No respiratory distress.      Breath sounds: No wheezing.   Abdominal:      Palpations: Abdomen is soft.      Tenderness: There is no abdominal tenderness.   Skin:     General: Skin is warm.   Neurological:      General: No focal deficit present.      Mental Status: He is alert and oriented to person, place, and time.         Assessment and Plan:     1. Well adult exam  - CBC Auto Differential; Future  - Comprehensive Metabolic Panel; Future  - Lipid Panel; Future  - Hemoglobin A1C; Future  - TSH; Future    2. Bilateral shoulder pain, unspecified chronicity  - Ambulatory referral/consult to Orthopedics; Future    3. SHERRIE (obstructive sleep apnea)  Continue cpap    RTC 12 mos or sooner prn  Tdap and Gardasil #1 today  Plan Gardasil #2 after 2 months       Vinod Martin MD  7/12/2024    This note was prepared using voice-recognition software.  Although efforts are made to proofread the note, some errors may persist in the final document.

## 2024-07-12 ENCOUNTER — OFFICE VISIT (OUTPATIENT)
Dept: PRIMARY CARE CLINIC | Facility: CLINIC | Age: 24
End: 2024-07-12
Payer: COMMERCIAL

## 2024-07-12 ENCOUNTER — LAB VISIT (OUTPATIENT)
Dept: LAB | Facility: HOSPITAL | Age: 24
End: 2024-07-12
Attending: INTERNAL MEDICINE
Payer: COMMERCIAL

## 2024-07-12 VITALS
WEIGHT: 164.25 LBS | HEART RATE: 60 BPM | DIASTOLIC BLOOD PRESSURE: 82 MMHG | SYSTOLIC BLOOD PRESSURE: 108 MMHG | BODY MASS INDEX: 22.99 KG/M2 | OXYGEN SATURATION: 98 % | HEIGHT: 71 IN

## 2024-07-12 DIAGNOSIS — M25.512 BILATERAL SHOULDER PAIN, UNSPECIFIED CHRONICITY: Primary | ICD-10-CM

## 2024-07-12 DIAGNOSIS — Z00.00 WELL ADULT EXAM: ICD-10-CM

## 2024-07-12 DIAGNOSIS — G47.33 OSA (OBSTRUCTIVE SLEEP APNEA): ICD-10-CM

## 2024-07-12 DIAGNOSIS — M25.511 BILATERAL SHOULDER PAIN, UNSPECIFIED CHRONICITY: Primary | ICD-10-CM

## 2024-07-12 PROCEDURE — 80053 COMPREHEN METABOLIC PANEL: CPT | Performed by: INTERNAL MEDICINE

## 2024-07-12 PROCEDURE — 36415 COLL VENOUS BLD VENIPUNCTURE: CPT | Mod: PN | Performed by: INTERNAL MEDICINE

## 2024-07-12 PROCEDURE — 83036 HEMOGLOBIN GLYCOSYLATED A1C: CPT | Performed by: INTERNAL MEDICINE

## 2024-07-12 PROCEDURE — 84443 ASSAY THYROID STIM HORMONE: CPT | Performed by: INTERNAL MEDICINE

## 2024-07-12 PROCEDURE — 99999 PR PBB SHADOW E&M-EST. PATIENT-LVL IV: CPT | Mod: PBBFAC,,, | Performed by: INTERNAL MEDICINE

## 2024-07-12 PROCEDURE — 85025 COMPLETE CBC W/AUTO DIFF WBC: CPT | Performed by: INTERNAL MEDICINE

## 2024-07-12 PROCEDURE — 80061 LIPID PANEL: CPT | Performed by: INTERNAL MEDICINE

## 2024-07-13 LAB
ALBUMIN SERPL BCP-MCNC: 4.6 G/DL (ref 3.5–5.2)
ALP SERPL-CCNC: 54 U/L (ref 55–135)
ALT SERPL W/O P-5'-P-CCNC: 20 U/L (ref 10–44)
ANION GAP SERPL CALC-SCNC: 8 MMOL/L (ref 8–16)
AST SERPL-CCNC: 46 U/L (ref 10–40)
BASOPHILS # BLD AUTO: 0.04 K/UL (ref 0–0.2)
BASOPHILS NFR BLD: 0.8 % (ref 0–1.9)
BILIRUB SERPL-MCNC: 1.6 MG/DL (ref 0.1–1)
BUN SERPL-MCNC: 15 MG/DL (ref 6–20)
CALCIUM SERPL-MCNC: 9.7 MG/DL (ref 8.7–10.5)
CHLORIDE SERPL-SCNC: 104 MMOL/L (ref 95–110)
CHOLEST SERPL-MCNC: 140 MG/DL (ref 120–199)
CHOLEST/HDLC SERPL: 2.2 {RATIO} (ref 2–5)
CO2 SERPL-SCNC: 26 MMOL/L (ref 23–29)
CREAT SERPL-MCNC: 1.1 MG/DL (ref 0.5–1.4)
DIFFERENTIAL METHOD BLD: NORMAL
EOSINOPHIL # BLD AUTO: 0.2 K/UL (ref 0–0.5)
EOSINOPHIL NFR BLD: 5 % (ref 0–8)
ERYTHROCYTE [DISTWIDTH] IN BLOOD BY AUTOMATED COUNT: 12.5 % (ref 11.5–14.5)
EST. GFR  (NO RACE VARIABLE): >60 ML/MIN/1.73 M^2
ESTIMATED AVG GLUCOSE: 97 MG/DL (ref 68–131)
GLUCOSE SERPL-MCNC: 67 MG/DL (ref 70–110)
HBA1C MFR BLD: 5 % (ref 4–5.6)
HCT VFR BLD AUTO: 43.2 % (ref 40–54)
HDLC SERPL-MCNC: 64 MG/DL (ref 40–75)
HDLC SERPL: 45.7 % (ref 20–50)
HGB BLD-MCNC: 14.1 G/DL (ref 14–18)
IMM GRANULOCYTES # BLD AUTO: 0.01 K/UL (ref 0–0.04)
IMM GRANULOCYTES NFR BLD AUTO: 0.2 % (ref 0–0.5)
LDLC SERPL CALC-MCNC: 67.8 MG/DL (ref 63–159)
LYMPHOCYTES # BLD AUTO: 1.3 K/UL (ref 1–4.8)
LYMPHOCYTES NFR BLD: 27.7 % (ref 18–48)
MCH RBC QN AUTO: 30.1 PG (ref 27–31)
MCHC RBC AUTO-ENTMCNC: 32.6 G/DL (ref 32–36)
MCV RBC AUTO: 92 FL (ref 82–98)
MONOCYTES # BLD AUTO: 0.5 K/UL (ref 0.3–1)
MONOCYTES NFR BLD: 11 % (ref 4–15)
NEUTROPHILS # BLD AUTO: 2.7 K/UL (ref 1.8–7.7)
NEUTROPHILS NFR BLD: 55.3 % (ref 38–73)
NONHDLC SERPL-MCNC: 76 MG/DL
NRBC BLD-RTO: 0 /100 WBC
PLATELET # BLD AUTO: 251 K/UL (ref 150–450)
PMV BLD AUTO: 11 FL (ref 9.2–12.9)
POTASSIUM SERPL-SCNC: 4.9 MMOL/L (ref 3.5–5.1)
PROT SERPL-MCNC: 7.6 G/DL (ref 6–8.4)
RBC # BLD AUTO: 4.69 M/UL (ref 4.6–6.2)
SODIUM SERPL-SCNC: 138 MMOL/L (ref 136–145)
TRIGL SERPL-MCNC: 41 MG/DL (ref 30–150)
TSH SERPL DL<=0.005 MIU/L-ACNC: 1.21 UIU/ML (ref 0.4–4)
WBC # BLD AUTO: 4.84 K/UL (ref 3.9–12.7)

## 2024-07-14 DIAGNOSIS — R74.01 ELEVATED AST (SGOT): Primary | ICD-10-CM

## 2024-07-15 ENCOUNTER — PATIENT MESSAGE (OUTPATIENT)
Dept: PRIMARY CARE CLINIC | Facility: CLINIC | Age: 24
End: 2024-07-15
Payer: COMMERCIAL

## 2024-07-29 ENCOUNTER — PATIENT MESSAGE (OUTPATIENT)
Dept: SLEEP MEDICINE | Facility: CLINIC | Age: 24
End: 2024-07-29
Payer: COMMERCIAL

## 2024-08-20 DIAGNOSIS — Z00.00 ROUTINE GENERAL MEDICAL EXAMINATION AT A HEALTH CARE FACILITY: Primary | ICD-10-CM

## 2024-11-06 ENCOUNTER — OFFICE VISIT (OUTPATIENT)
Dept: SLEEP MEDICINE | Facility: CLINIC | Age: 24
End: 2024-11-06
Attending: PSYCHIATRY & NEUROLOGY
Payer: COMMERCIAL

## 2024-11-06 VITALS
HEIGHT: 71 IN | BODY MASS INDEX: 24.78 KG/M2 | WEIGHT: 177 LBS | DIASTOLIC BLOOD PRESSURE: 66 MMHG | SYSTOLIC BLOOD PRESSURE: 112 MMHG | HEART RATE: 63 BPM

## 2024-11-06 DIAGNOSIS — G47.33 OSA (OBSTRUCTIVE SLEEP APNEA): Primary | ICD-10-CM

## 2024-11-06 PROCEDURE — 99999 PR PBB SHADOW E&M-EST. PATIENT-LVL III: CPT | Mod: PBBFAC,,, | Performed by: PSYCHIATRY & NEUROLOGY

## 2024-11-06 PROCEDURE — 3078F DIAST BP <80 MM HG: CPT | Mod: CPTII,S$GLB,, | Performed by: PSYCHIATRY & NEUROLOGY

## 2024-11-06 PROCEDURE — 3008F BODY MASS INDEX DOCD: CPT | Mod: CPTII,S$GLB,, | Performed by: PSYCHIATRY & NEUROLOGY

## 2024-11-06 PROCEDURE — 3044F HG A1C LEVEL LT 7.0%: CPT | Mod: CPTII,S$GLB,, | Performed by: PSYCHIATRY & NEUROLOGY

## 2024-11-06 PROCEDURE — 99214 OFFICE O/P EST MOD 30 MIN: CPT | Mod: S$GLB,,, | Performed by: PSYCHIATRY & NEUROLOGY

## 2024-11-06 PROCEDURE — 3074F SYST BP LT 130 MM HG: CPT | Mod: CPTII,S$GLB,, | Performed by: PSYCHIATRY & NEUROLOGY

## 2024-11-06 NOTE — PROGRESS NOTES
2024     9:45 AM 2022    10:54 AM   EPWORTH SLEEPINESS SCALE TOTAL SCORE    Total score 2  4       Patient-reported       Joo Mariscal is a 24 y.o. male seen today for CPAP  follow up. Last seen on 2022.    APAP sicne last visit.  No more parasomnia since starting APAP.  No recent sexsomia or other parasomnia since startig APAP  Took Melatonin XR then stopped.      The patient reports improved sleep continuity and daytime sleepiness on PAP. ESS today is .  Denies break through snoring.  No  dry mouth.  No aerophagia or air hunger. Denies occasional mask leaks and discomfort. Using a pillows mask       DME: LO got machine in 2024 - 2024 Patient ID: [id number] : 2000 Age: 24 years Gender: Male Total Gift Card Combo [id number] [facility], [id number] Compliance Report Compliance Payor Standard Usage 2024 - 2024 Usage days 30/30 days (100%) >= 4 hours 29 days (97%) < 4 hours 1 days (3%) Usage hours 229 hours 59 minutes Average usage (total days) 7 hours 40 minutes Average usage (days used) 7 hours 40 minutes Median usage (days used) 7 hours 52 minutes Total used hours (value since last reset - 2024) 3,656 hours AirSense 11 AutoSet Serial number [id number] Mode AutoSet Min Pressure 4 cmH2O Max Pressure 16 cmH2O EPR Fulltime EPR level 3 Response Standard Therapy Pressure - cmH2O Median: 6.3 95th percentile: 9.4 Maximum: 10.4 Leaks - L/min Median: 2.3 95th percentile: 15.5 Maximum: 22.6 Events per hour AI: 1.0 HI: 0.2 AHI: 1.2 Apnea Index Central: 0.7 Obstructive: 0.3 Unknown: 0.0 RERA Index 0.0 Cheyne-Crane respiration (average duration per night) 0 minutes (0%)    Sleep studies:         INTERVAL HISTORY:    2022:    Joo Mariscal is a 24 y.o. male seen today for Parasomnia and SHERRIE  follow up. Last seen on 2022    Melatonin XR taken every night 5 mg since his last visit up to 10 mg provided him with deep sleep.  No episodes  of Sexsomnia were taking place since starting Melatonin in 10/22 up  until last week (the event was mild). Happened right after falling asleep as usual.    Coming to discuss sleep study results.  Would like to pursue treatment of mild SHERRIE to see if less sleep interruptions witll result in resolution of sexsomnia.      Initial history:  Reports episodes of makingsexual advances on his girlfriend in his sleep without recollection over the last year. This his fist serious relationship- so he is not sure if he has done that prior to that. His eyes could be open or closed, epio during the episodes; and he would get confused if woken up in the middle of an episode  In his college years he sleepwalked and urinated on the floor.  He does not recall sleepwalking in childhood, but has a family history of that.  He has been sleep talking; used to grind his teeth in his sleep in childhood      Joo Mariscal  reports symptoms concerning for parasomnia as described above; denight screaming out of his sleep; also reported occasional nightmare. One episode of dream enacting.   T  Bedtime: 10 PM  Leep latency no more than an hour  Awakenings per night: 0-1 to go to the bathroom - going back to the bathroom.  Wake time: 6 AM    Medications pertinent to sleep  disorders taken currently: Melatonin TR 5 (on weekdays)-10(on weeknds)  Previous  medications taken  for sleep disorders:       Sleep studies  HST  10/22 Mild sleep disordered breathing (AHI=6) is noted based on a 4% hypopnea desaturation criteria. The patient slept supine 89.4% of the night based on valid recording  time of 5.99 hours. When considering more subtle measures of sleep disordered breathing, the overall respiratory  disturbance index is mild(RDI=12) based on a 1% hypopnea desaturation criteria with confirmation by surrogate arousal  indicators. The apneas/hypopneas are accompanied by minimal oxygen desaturation (percent time below 90% SpO2: 0%,  Min SpO2: 90%).        Occupation: 9 hrs    Bed partner: his girlfired  Exercise routine: no  Caffeine:  3-4 cups beverages per day; no later than 12 ; no later than 3 pm (rare)  Alcohol: no  Smoking:no      ASSESSMENT:    1. 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  2.SHERRIE - mild  The patient symptomatically has  snoring, non refreshing sleep, excessive daytime fatigue, difficulty with focusing, and short term memory concerns  and parasomnia which may be triggered by arousals from N3 sleep. This warrants further investigation for possible obstructive sleep apnea.          PLAN:    Sleep study were discussed during this tele-visit with graphs and chart demonstration, all the questions were answered.   Continue APAP at current settings.    Following recommendations were given in the AVS:   Try mouth tape for CPAP users  Try increase humidity or nasal spray before bed  May need CPAP hose cover or heated hose   Elevated head of the bed      Continue Melatonin XR 5-10 mg PRN      During our discussion today, we talked about the etiology of SHERRIE as well as the potential ramifications of untreated sleep apnea, which could include daytime sleepiness, hypertension, heart disease and/or stroke.  We discussed potential treatment options, which could include weight loss, body positioning, continuous positive airway pressure (CPAP), or referral for surgical consideration. Meanwhile, he  is urged to avoid supine sleep, weight gain and alcoholic beverages since all of these can worsen SHERRIE.     The patient was given open opportunity to ask questions and/or express concerns about treatment plan. Two point patient identifier confirmed.       Precautions: The patient was advised to abstain from driving should he feel sleepy or drowsy.    Follow up: MD after the sleep study has been completed.     31-minute visit. >50% spent counseling patient and coordination of care.  The patient was   cautioned against drowsy driving.

## 2024-11-06 NOTE — PATIENT INSTRUCTIONS
Following recommendations were given in the AVS:   Try mouth tape for CPAP users  Try increase humidity or nasal spray before bed  May need CPAP hose cover or heated hose   Elevated head of the bed      Continue Melatonin XR 5-10 mg PRN